# Patient Record
Sex: FEMALE | Race: BLACK OR AFRICAN AMERICAN | Employment: UNEMPLOYED | URBAN - METROPOLITAN AREA
[De-identification: names, ages, dates, MRNs, and addresses within clinical notes are randomized per-mention and may not be internally consistent; named-entity substitution may affect disease eponyms.]

---

## 2018-10-04 ENCOUNTER — HOSPITAL ENCOUNTER (OUTPATIENT)
Age: 63
Discharge: HOME | End: 2018-10-04
Attending: INTERNAL MEDICINE
Payer: MEDICARE

## 2018-10-04 DIAGNOSIS — M79.672 LEFT FOOT PAIN: ICD-10-CM

## 2018-10-04 PROCEDURE — 73718 MRI LOWER EXTREMITY W/O DYE: CPT

## 2018-10-23 ENCOUNTER — OFFICE VISIT (OUTPATIENT)
Dept: ORTHOPEDIC SURGERY | Age: 63
End: 2018-10-23

## 2018-10-23 VITALS
HEART RATE: 81 BPM | HEIGHT: 66 IN | SYSTOLIC BLOOD PRESSURE: 161 MMHG | BODY MASS INDEX: 23.78 KG/M2 | DIASTOLIC BLOOD PRESSURE: 82 MMHG | OXYGEN SATURATION: 99 % | TEMPERATURE: 97.9 F | RESPIRATION RATE: 16 BRPM | WEIGHT: 148 LBS

## 2018-10-23 DIAGNOSIS — M77.41 METATARSALGIA OF RIGHT FOOT: ICD-10-CM

## 2018-10-23 DIAGNOSIS — M79.671 RIGHT FOOT PAIN: ICD-10-CM

## 2018-10-23 DIAGNOSIS — M19.071 PRIMARY OSTEOARTHRITIS OF RIGHT FOOT: Primary | ICD-10-CM

## 2018-10-23 RX ORDER — METOPROLOL SUCCINATE 200 MG/1
TABLET, EXTENDED RELEASE ORAL
COMMUNITY
Start: 2018-05-12

## 2018-10-23 RX ORDER — WARFARIN 2.5 MG/1
2.5 TABLET ORAL
COMMUNITY
Start: 2014-11-23

## 2018-10-23 RX ORDER — VALACYCLOVIR HYDROCHLORIDE 1 G/1
1 TABLET, FILM COATED ORAL
COMMUNITY
Start: 2018-09-17

## 2018-10-23 RX ORDER — GABAPENTIN 300 MG/1
300 CAPSULE ORAL
COMMUNITY
Start: 2018-06-25

## 2018-10-23 RX ORDER — AMLODIPINE BESYLATE 5 MG/1
5 TABLET ORAL
COMMUNITY
Start: 2018-10-18

## 2018-10-23 RX ORDER — BENZONATATE 200 MG/1
200 CAPSULE ORAL
COMMUNITY
Start: 2018-04-30

## 2018-10-23 RX ORDER — ALENDRONATE SODIUM 70 MG/1
TABLET ORAL
Refills: 5 | COMMUNITY
Start: 2018-10-11

## 2018-10-23 NOTE — PROGRESS NOTES
AMBULATORY PROGRESS NOTE Patient: Fatou Vu             MRN: [id number]     SSN: [id number] Body mass index is 23.89 kg/m². YOB: 1955     AGE: 66 y.o. EX: female PCP: Patrice Rankin MD 
 
 IMPRESSION/DIAGNOSIS AND TREATMENT PLAN  
 
DIAGNOSES 1. Primary osteoarthritis of right foot 2. Metatarsalgia of right foot 3. Right foot pain Orders Placed This Encounter  AMB SUPPLY ORDER  
 [21091] Foot Min 3V Fatou Vu understands her diagnoses and the proposed plan. My impression is that she has idiopathic thrombocytopenic purpura (ITP). Orthopedic- wise, she has some early OA, right great toe 1st MTP. She will benefit from a graphite insert Joyce extension. She also has some, in my opinion, transfer metatarsalgia when she is loading the lesser metatarsal head regions. She had x-rays of her foot today, 3 views, AP, lateral, and oblique, nonweightbearing, which shows some mild OA, right great toe 1st MTP. No osteolytic or osteoblastic lesions seen. No fractures, no subluxations, and no dislocations. There is some slight, slight thickening of the 5th metatarsal base region, best seen on the oblique films, but she is clinically nontender in this region. Additional note, patient had an MRI, right foot, on 10/04/2018 results are listed in the diagnostic section of this report indicating primarily some early OA, right great toe 1st MTP. Plans are listed as below. Plan: 
 
1) DME Order: Graphite insert with Joyce's extension (TPC). 2) Continue activity modification as directed. RTO - 5 weeks HPI AND EXAMINATION Fatou Vu IS A 66 y.o. female who presents to my outpatient office complaining of right foot pain. Ms. Emy Nielsen states that she has been having pain and discomfort in her right foot for the past year.  She reports that she has had pain in her great toe and fifth toe, and that she has been experiencing discoloration of her forefoot and toes. She notes that her toes are cold to the touch in the mornings. She further notes that the air and light touch causes sharp pain to her foot. At this time, she will get a graphite orthotic insert with Joyce's extension and will follow up in 5 weeks. The patient has h/o ITP and blood clots in her LLE. ANKLE/FOOT right Psychiatry: Alert, Oriented x 3; Speech normal in context and clarity,  
         Memory intact grossly, no involuntary movements - tremors, no dementia Gait: Normal 
Tenderness: Mild tenderness to the fifth metatarsal 
Cutaneous: Purpura across the plantar forefoot and toes. Bony ridge on the dorsal part of the great toe, first MTP Joint Motion: 45 degrees of extension of the first MTP 
  10-15 degrees of flexion of the first MTP Joint / Tendon Stability: No Ankle or Subtalar instability or joint laxity. No peroneal sublux ability or dislocation Alignment: Forefoot, Midfoot, Hindfoot WNL. Neuro Mild sensitivity to touch Vascular: NL foot/ankle pulses. Lymphatics: No extremity lymphedema, No calf swelling, no tenderness to calf muscles. CHART REVIEW No past medical history on file. Current Outpatient Medications Medication Sig  
 amLODIPine (NORVASC) 5 mg tablet 5 mg.  benzonatate (TESSALON) 200 mg capsule 200 mg.  
 gabapentin (NEURONTIN) 300 mg capsule 300 mg.  alendronate (FOSAMAX) 70 mg tablet TAKE 1 TAB BY MOUTH EVERY 7 DAYS. REPLACES ACTONEL DUE TO FORMULARY.  metoprolol succinate (TOPROL-XL) 200 mg XL tablet TAKE 1 TABLET EVERY DAY  valACYclovir (VALTREX) 1 gram tablet 1 g.  
 warfarin (COUMADIN) 2.5 mg tablet 2.5 mg.  
 romiplostim (NPLATE SC) by SubCUTAneous route. No current facility-administered medications for this visit. No Known Allergies No past surgical history on file. Social History Occupational History  Not on file Tobacco Use  
  Smoking status: Former Smoker  Smokeless tobacco: Never Used Substance and Sexual Activity  Alcohol use: Not on file  Drug use: Not on file  Sexual activity: Not on file No family history on file. REVIEW OF SYSTEMS : 10/23/2018  ALL BELOW ARE Negative except : SEE HPI Constitutional: Negative for fever, chills and weight loss. Neg Weight Loss Cardiovascular: Negative for chest pain, claudication and leg swelling. SOB, PABON Gastrointestinal/Urological: Negative for  pain, N/V/D/C, Blood in stool or urine,dysuria                         Hematuria, Incontinence, pelvic pain Musculoskeletal: see HPI. Neurological: Negative for dizziness and weakness, headaches,Visual Changes             Confusion,  Or Seizures, Psychiatric/Behavioral: Negative for depression, memory loss and substance abuse. Extremities:  Negative for hair changes, rash or skin lesion changes. Hematologic: Negative for Bleeding problems, bruising, pallor or swollen lymph nodes. Peripheral Vascular: No calf pain, vascular vein tenderness to calf pain No calf throbbing, posterior knee throbbing pain DIAGNOSTIC IMAGING  
 
MRI Results (most recent): 
Results from Hospital Encounter encounter on 10/04/18 MRI FOOT RT WO CONT Narrative Procedure: MRI of the right forefoot without contrast. 
 
Indications: Right foot pain and swelling at the fifth digit Comparisons: None Technique: Triplanar T1 without fat-sat and T2 weighted with fat-sat MRI images 
of the forefoot were obtained. Findings: 
 
Osseous structures: No evidence for fracture, contusion or AVN. Mild 
degenerative changes first MTP joint with osteophytes and mild irregularities or 
tiny subchondral cysts in medial first metatarsal head. Joints: No joint effusions. No osteochondral lesion. Muscles and tendons: Unremarkable tendineus structures. No muscular edema. There is atrophy in the quadratus plantae muscle or possibly intramuscular lipoma. Ligaments: Intact Lisfranc ligament. Intact distal aspect of the plantar fascia. Intact plantar plate. Others: No fluid collection. No mass. Impression IMPRESSION: 
 
1. No fracture or contusion. 2. Mild first MTP osteoarthrosis. 3. Atrophy in the quadratus plantae muscle or possibly intramuscular lipoma, 
incompletely evaluated. Please see above section of this report. I have personally reviewed the results of the above study. The interpretation of this study is my professional opinion. Written by Cuco Buck, as dictated by Dr. Pavithra Martinez. I, Dr. Pavithra Martinez, confirm that all documentation is accurate.  
 
 
Wilma Reaves MD 
10/23/2018 
4:16 PM

## 2018-10-23 NOTE — PATIENT INSTRUCTIONS
Please follow up in 5 weeks. You are advised to contact us if your condition worsens. You have been provided with an order for durable medical equipment that you may  at an outside facility as our office does not carry the equipment you need. You may pick it up at any medical supply company you like. Listed below are a few different locations for your convenience: [facility] [address] [facility], [address] Phone: [phone] [facility] [address], [address] [place], [address] [address] Phone: [phone] [address] Phone: [phone] [place]:  
[address] [place], [address] [place]: 
[address] [place], [address] [place]: 
[address] [facility], [address] Neuropathic Pain: Care Instructions Your Care Instructions Neuropathic pain is caused by pressure on or damage to your nerves. It's often simply called nerve pain. Some people feel this type of pain all the time. For others, it comes and goes. Diabetes, shingles, or an injury can cause nerve pain. Many people say the pain feels sharp, burning, or stabbing. But some people feel it as a dull ache. In some cases, it makes your skin very sensitive. So touch, pressure, and other sensations that did not hurt before may now cause pain. It's important to know that this kind of pain is real and can affect your quality of life. It's also important to know that treatment can help. Treatment includes pain medicines, exercise, and physical therapy. Medicines can help reduce the number of pain signals that travel over the nerves. This can make the painful areas less sensitive. It can also help you sleep better and improve your mood. But medicines are only one part of successful treatment. Most people do best with more than one kind of treatment.  Your doctor may recommend that you try cognitive-behavioral therapy and stress management. Or, if needed, you may decide to try to quit smoking, lower your blood pressure, or better control blood sugar. These kinds of healthy changes can also make a difference. If you feel that your treatment is not working, talk to your doctor. And be sure to tell your doctor if you think you might be depressed or anxious. These are common problems that can also be treated. Follow-up care is a key part of your treatment and safety. Be sure to make and go to all appointments, and call your doctor if you are having problems. It's also a good idea to know your test results and keep a list of the medicines you take. How can you care for yourself at home? · Be safe with medicines. Read and follow all instructions on the label. ? If the doctor gave you a prescription medicine for pain, take it as prescribed. ? If you are not taking a prescription pain medicine, ask your doctor if you can take an over-the-counter medicine. · Save hard tasks for days when you have less pain. Follow a hard task with an easy task. And remember to take breaks. · Relax, and reduce stress. You may want to try deep breathing or meditation. These can help. · Keep moving. Gentle, daily exercise can help reduce pain. Your doctor or physical therapist can tell you what type of exercise is best for you. This may include walking, swimming, and stationary biking. It may also include stretches and range-of-motion exercises. · Try heat, cold packs, and massage. · Get enough sleep. Constant pain can make you more tired. If the pain makes it hard to sleep, talk with your doctor. · Think positively. Your thoughts can affect your pain. Do fun things to distract yourself from the pain. See a movie, read a book, listen to music, or spend time with a friend. · Keep a pain diary.  Try to write down how strong your pain is and what it feels like. Also try to notice and write down how your moods, thoughts, sleep, activities, and medicine affect your pain. These notes can help you and your doctor find the best ways to treat your pain. Reducing constipation caused by pain medicine Pain medicines often cause constipation. To reduce constipation: 
· Include fruits, vegetables, beans, and whole grains in your diet each day. These foods are high in fiber. · Drink plenty of fluids, enough so that your urine is light yellow or clear like water. If you have kidney, heart, or liver disease and have to limit fluids, talk with your doctor before you increase the amount of fluids you drink. · Get some exercise every day. Build up slowly to 30 to 60 minutes a day on 5 or more days of the week. · Take a fiber supplement, such as Citrucel or Metamucil, every day if needed. Read and follow all instructions on the label. · Schedule time each day for a bowel movement. Having a daily routine may help. Take your time and do not strain when having a bowel movement. · Ask your doctor about a laxative. The goal is to have one easy bowel movement every 1 to 2 days. Do not let constipation go untreated for more than 3 days. When should you call for help? Call your doctor now or seek immediate medical care if: 
  · You feel sad, anxious, or hopeless for more than a few days. This could mean you are depressed. Depression is common in people who have a lot of pain. But it can be treated.  
  · You have trouble with bowel movements, such as: 
? No bowel movement in 3 days. ? Blood in the anal area, in your stool, or on the toilet paper. ? Diarrhea for more than 24 hours.  
 Watch closely for changes in your health, and be sure to contact your doctor if: 
  · Your pain is getting worse.  
  · You can't sleep because of pain.  
  · You are very worried or anxious about your pain.  
  · You have trouble taking your pain medicine.   · You have any concerns about your pain medicine or its side effects.  
  · You have vomiting or cramps for more than 2 hours. Where can you learn more? Go to http://paz-jorge.info/. Enter N819 in the search box to learn more about \"Neuropathic Pain: Care Instructions. \" Current as of: June 4, 2018 Content Version: 11.8 © 7838-0200 [employer]. Care instructions adapted under license by [employer] (which disclaims liability or warranty for this information). If you have questions about a medical condition or this instruction, always ask your healthcare professional. [place] [address] any warranty or liability for your use of this information.